# Patient Record
Sex: FEMALE | Race: AMERICAN INDIAN OR ALASKA NATIVE
[De-identification: names, ages, dates, MRNs, and addresses within clinical notes are randomized per-mention and may not be internally consistent; named-entity substitution may affect disease eponyms.]

---

## 2019-07-18 ENCOUNTER — HOSPITAL ENCOUNTER (EMERGENCY)
Dept: HOSPITAL 5 - ED | Age: 39
LOS: 1 days | Discharge: STILL ADMITTED OR ADMINISTRATIVE | End: 2019-07-19
Payer: COMMERCIAL

## 2019-07-18 DIAGNOSIS — O20.8: ICD-10-CM

## 2019-07-18 DIAGNOSIS — R10.2: ICD-10-CM

## 2019-07-18 DIAGNOSIS — O26.891: Primary | ICD-10-CM

## 2019-07-18 DIAGNOSIS — N83.202: ICD-10-CM

## 2019-07-18 DIAGNOSIS — O34.81: ICD-10-CM

## 2019-07-18 DIAGNOSIS — Z3A.01: ICD-10-CM

## 2019-07-18 LAB
BASOPHILS # (AUTO): 0 K/MM3 (ref 0–0.1)
BASOPHILS NFR BLD AUTO: 0.4 % (ref 0–1.8)
BUN SERPL-MCNC: 19 MG/DL (ref 7–17)
BUN/CREAT SERPL: 32 %
CALCIUM SERPL-MCNC: 9 MG/DL (ref 8.4–10.2)
EOSINOPHIL # BLD AUTO: 0 K/MM3 (ref 0–0.4)
EOSINOPHIL NFR BLD AUTO: 0.5 % (ref 0–4.3)
HCT VFR BLD CALC: 33.2 % (ref 30.3–42.9)
HEMOLYSIS INDEX: 8
HGB BLD-MCNC: 11.4 GM/DL (ref 10.1–14.3)
LYMPHOCYTES # BLD AUTO: 0.8 K/MM3 (ref 1.2–5.4)
LYMPHOCYTES NFR BLD AUTO: 10.5 % (ref 13.4–35)
MCH RBC QN AUTO: 30 PG (ref 28–32)
MCHC RBC AUTO-ENTMCNC: 34 % (ref 30–34)
MCV RBC AUTO: 89 FL (ref 79–97)
MONOCYTES # (AUTO): 0.3 K/MM3 (ref 0–0.8)
MONOCYTES % (AUTO): 3.7 % (ref 0–7.3)
PLATELET # BLD: 155 K/MM3 (ref 140–440)
RBC # BLD AUTO: 3.73 M/MM3 (ref 3.65–5.03)

## 2019-07-18 PROCEDURE — 76817 TRANSVAGINAL US OBSTETRIC: CPT

## 2019-07-18 PROCEDURE — 85610 PROTHROMBIN TIME: CPT

## 2019-07-18 PROCEDURE — 36415 COLL VENOUS BLD VENIPUNCTURE: CPT

## 2019-07-18 PROCEDURE — 99284 EMERGENCY DEPT VISIT MOD MDM: CPT

## 2019-07-18 PROCEDURE — 86901 BLOOD TYPING SEROLOGIC RH(D): CPT

## 2019-07-18 PROCEDURE — 84702 CHORIONIC GONADOTROPIN TEST: CPT

## 2019-07-18 PROCEDURE — 80048 BASIC METABOLIC PNL TOTAL CA: CPT

## 2019-07-18 PROCEDURE — 86900 BLOOD TYPING SEROLOGIC ABO: CPT

## 2019-07-18 PROCEDURE — 85025 COMPLETE CBC W/AUTO DIFF WBC: CPT

## 2019-07-18 PROCEDURE — 76801 OB US < 14 WKS SINGLE FETUS: CPT

## 2019-07-18 NOTE — EMERGENCY DEPARTMENT REPORT
ED Female  HPI





- General


Chief complaint: Vaginal Bleeding


Stated complaint: ABD PAIN


Time Seen by Provider: 19 23:03


Source: EMS


Mode of arrival: Stretcher


Limitations: No Limitations





- History of Present Illness


Initial comments: 





Patient is 39 years old female  5 para 4 at 8 weeks pregnant.  Patient 

presented to the ER complaining of vaginal bleeding.  Patient stated that she 

changed her pads approximately 8 times since 6:00.  Patient stated that symptoms

started 5 days ago and she went to another ER and had an ultrasound and she was 

told that she is having miscarriage and there is no baby heartbeat.  Patient 

stated that her symptoms just got worse tonight.  Patient denied any dizziness, 

chest pain or shortness of breath.


MD Complaint: vaginal bleeding, pelvic pain


-: days(s) (5)


Radiation: suprapubic


Severity: moderate


Severity scale (0 -10): 5


Quality: cramping


Are you Pregnant Now?: Yes





ED Review of Systems


ROS: 


Stated complaint: ABD PAIN


Other details as noted in HPI





Comment: All other systems reviewed and negative


Constitutional: denies: chills, fever


ENT: denies: throat pain


Cardiovascular: denies: chest pain, palpitations


Gastrointestinal: abdominal pain.  denies: nausea, vomiting, diarrhea, 

constipation, hematemesis, melena, hematochezia


Genitourinary: abnormal menses.  denies: hematuria, dyspareunia


Musculoskeletal: denies: back pain


Neurological: denies: headache, weakness, numbness, paresthesias, confusion, 

abnormal gait, vertigo





ED Past Medical Hx





- Past Medical History


Previous Medical History?: No





- Surgical History


Past Surgical History?: No





- Social History


Smoking Status: Never Smoker


Substance Use Type: None





ED Physical Exam





- General


Limitations: No Limitations


General appearance: alert, in no apparent distress





- Head


Head exam: Present: atraumatic, normocephalic, normal inspection





- Eye


Eye exam: Present: normal appearance, PERRL





- ENT


ENT exam: Present: normal exam, normal orophraynx, mucous membranes moist





- Neck


Neck exam: Present: normal inspection, full ROM.  Absent: tenderness, 

meningismus, lymphadenopathy, thyromegaly





- Respiratory


Respiratory exam: Present: normal lung sounds bilaterally





- Cardiovascular


Cardiovascular Exam: Present: regular rate, normal rhythm, normal heart sounds





- GI/Abdominal


GI/Abdominal exam: Present: soft, normal bowel sounds.  Absent: distended, 

tenderness, guarding, rebound, rigid, organomegaly, mass, bruit, pulsatile mass,

hernia





- Extremities Exam


Extremities exam: Present: normal inspection, full ROM, normal capillary refill.

 Absent: pedal edema, calf tenderness





- Back Exam


Back exam: Present: normal inspection, full ROM.  Absent: CVA tenderness (R), 

CVA tenderness (L), muscle spasm, paraspinal tenderness, vertebral tenderness





- Neurological Exam


Neurological exam: Present: alert, oriented X3, CN II-XII intact, normal gait, 

reflexes normal





- Psychiatric


Psychiatric exam: Present: normal mood





- Skin


Skin exam: Present: warm, intact, normal color





ED Course


                                   Vital Signs











  19





  23:01 01:18


 


Temperature 99.3 F 


 


Pulse Rate 93 H 88


 


Respiratory 18 18





Rate  


 


Blood Pressure 107/68 


 


Blood Pressure  112/61





[Right]  


 


O2 Sat by Pulse 100 97





Oximetry  














ED Medical Decision Making





- Lab Data


Result diagrams: 


                                 19 23:18





                                 19 23:18





- Radiology Data


Radiology results: report reviewed





Referring Physician:   SHARA JAIN


Patient Name:   GREGOR FENTON


Patient ID:   [id number]


YOB: 1980


Sex:   Female


Accession:   [id number]


Report Date:   2019


Report Status:   Finalized


[place]


[place] [id number] 





Ultrasound Report 


Signed 





Patient: GREGOR FENTON MR#: [id number] 


0812 


: 1980 Acct:[id number] 





Age/Sex: 39 / F ADM Date: 19 





Loc: ED 


Attending Dr: 








Ordering Physician: SHARA JAIN 


Date of Service: 19 


Procedure(s): US OB transvaginal 


Accession Number(s): [id number] 





cc: SHARA JAIN 








US OB <= 14 weeks fetus, US OB transvaginal 





INDICATION / CLINICAL INFORMATION: 


8 weeks pregnant, vaginal bleeding abdominal cramp. 





COMPARISON: 


None available. 





FINDINGS: 


Uterus measures 10 cm. 


The myometrial echogenicity is inhomogeneous most likely on the basis of uterine

 fibroids. 


The endometrial stripe thickness is 16 mm. 


No evidence of intrauterine pregnancy. 





The right ovary measures 2.3 x 2.0 cm. 


Left ovary measures 4.2 x 2.6 cm. 


Multiple left ovarian cysts are identified. 





Doppler imaging shows normal blood flow in both ovaries. 





There is a small free fluid collection identified in the cul-de-sac. 


At the level of the cervix there is an area of inhomogeneous echogenicity that 

could represent 


blood clot. 





IMPRESSION: 


1. No demonstrated intrauterine pregnancy. 


2. Small left ovarian cyst, no evidence of ovarian torsion. 


3. Hypoechoic echogenic area in the cervical canal probably representing blood 

clot. 





Signer Name: Nasir Kang MD 


Signed: 2019 1:10 AM 


Workstation Name: [id number] 








Transcribed By: GA 


Dictated By: Nasir Kang MD 


Electronically Authenticated By: Nasir Kang MD 


Signed Date/Time: 19 











DD/DT: 19 


TD/TT:





- Medical Decision Making





Patient is 39 years old female  5 para 4 at 8 weeks pregnant.  Patient 

presented to the ER complaining of vaginal bleeding.  Patient stated that she 

changed her pads approximately 8 times since 6:00.  Patient stated that symptoms

 started 5 days ago and she went to another ER and had an ultrasound and she was

 told that she is having miscarriage and there is no baby heartbeat.  Patient 

stated that her symptoms just got worse tonight.  Patient denied any dizziness, 

chest pain or shortness of breath.





Patient continued to bleed in the ER.  Vital signs stable.  H&H is stable.  I 

discussed the patient is Dr. Oz Higuera, he stated that he is coming down to 

evaluate the patient.


Critical care attestation.: 


If time is entered above; I have spent that time in minutes in the direct care 

of this critically ill patient, excluding procedure time.








ED Disposition


Clinical Impression: 


 Vaginal bleeding during pregnancy, Abdominal pain affecting pregnancy





Disposition:  OP ADMIT IP TO THIS HOSP


Is pt being admited?: Yes


Condition: Stable

## 2019-07-19 VITALS — SYSTOLIC BLOOD PRESSURE: 104 MMHG | DIASTOLIC BLOOD PRESSURE: 74 MMHG

## 2019-07-19 LAB — INR PPP: 1.09 (ref 0.87–1.13)

## 2019-07-19 NOTE — ULTRASOUND REPORT
US OB <= 14 weeks fetus, US OB transvaginal



INDICATION / CLINICAL INFORMATION:

8 weeks pregnant, vaginal bleeding abdominal cramp.



COMPARISON:

None available.



FINDINGS:

Uterus measures 10 cm.

The myometrial echogenicity is inhomogeneous most likely on the basis of uterine fibroids.

The endometrial stripe thickness is 16 mm.

No evidence of intrauterine pregnancy.



The right ovary measures 2.3 x 2.0 cm.

Left ovary measures 4.2 x 2.6 cm.

Multiple left ovarian cysts are identified.



Doppler imaging shows normal blood flow in both ovaries.



There is a small free fluid collection identified in the cul-de-sac.

At the level of the cervix there is an area of inhomogeneous echogenicity that could represent blood 
clot.



IMPRESSION:

1. No demonstrated intrauterine pregnancy.

2. Small left ovarian cyst, no evidence of ovarian torsion.

3. Hypoechoic echogenic area in the cervical canal probably representing blood clot. 



Signer Name: Nasir Kang MD 

Signed: 7/19/2019 1:10 AM

 Workstation Name: [id number]

## 2019-07-19 NOTE — ULTRASOUND REPORT
US OB <= 14 weeks fetus, US OB transvaginal



INDICATION / CLINICAL INFORMATION:

8 weeks pregnant, vaginal bleeding abdominal cramp.



COMPARISON:

None available.



FINDINGS:

Uterus measures 10 cm.

The myometrial echogenicity is inhomogeneous most likely on the basis of uterine fibroids.

The endometrial stripe thickness is 16 mm.

No evidence of intrauterine pregnancy.



The right ovary measures 2.3 x 2.0 cm.

Left ovary measures 4.2 x 2.6 cm.

Multiple left ovarian cysts are identified.



Doppler imaging shows normal blood flow in both ovaries.



There is a small free fluid collection identified in the cul-de-sac.

At the level of the cervix there is an area of inhomogeneous echogenicity that could represent blood 
clot.



IMPRESSION:

1. No demonstrated intrauterine pregnancy.

2. Small left ovarian cyst, no evidence of ovarian torsion.

3. Hypoechoic echogenic area in the cervical canal probably representing blood clot. 



Signer Name: Nasir Kang MD 

Signed: 7/19/2019 1:10 AM

 Workstation Name: [id number]